# Patient Record
Sex: FEMALE
[De-identification: names, ages, dates, MRNs, and addresses within clinical notes are randomized per-mention and may not be internally consistent; named-entity substitution may affect disease eponyms.]

---

## 2022-12-21 ENCOUNTER — NURSE TRIAGE (OUTPATIENT)
Dept: OTHER | Facility: CLINIC | Age: 37
End: 2022-12-21

## 2022-12-21 NOTE — TELEPHONE ENCOUNTER
[place]    Received call from [place] at [facility] with Red Flag Complaint. Limited triage, pt not present. \"I am just trying to help her find a doctor\"    Subjective: Caller states \"She is weak, tired, wore out from walking up a few steps. Body aches. \"     Current Symptoms: fatigue. Has flu 3 weeks ago. N/v/d. \"A few times\"     Onset: 1 week ago; unchanged    Associated Symptoms: generalized body aches. Pain Severity: pt not present to rate     Temperature: denies     What has been tried: increased fluids    LMP:  states he doesn't know when last menses was  Pregnant: No    Recommended disposition: See PCP within 3 Days. Patient does not have a provider. Instructed if unable to get into a provider to go to urgent care or walk in clinic. Care advice provided, patient verbalizes understanding; denies any other questions or concerns; instructed to call back for any new or worsening symptoms. Patient/Caller agrees with recommended disposition; writer provided warm transfer to [facility] for appointment scheduling    Attention Provider: Thank you for allowing me to participate in the care of your patient. The patient was connected to triage in response to information provided to the [facility]. Please do not respond through this encounter as the response is not directed to a shared pool. Reason for Disposition   [1] Caller is not with the adult (patient) AND [2] reporting urgent symptoms   MILD weakness (i.e., does not interfere with ability to work, go to school, normal activities) and persists > 1 week    Protocols used:  Information Only Call - No Triage-ADULT-[facility], Weakness (Generalized) and Fatigue-ADULT-[facility]